# Patient Record
Sex: MALE | Race: WHITE | NOT HISPANIC OR LATINO | Employment: OTHER | URBAN - METROPOLITAN AREA
[De-identification: names, ages, dates, MRNs, and addresses within clinical notes are randomized per-mention and may not be internally consistent; named-entity substitution may affect disease eponyms.]

---

## 2017-02-22 PROBLEM — Z12.11 COLON CANCER SCREENING: Status: ACTIVE | Noted: 2017-02-22

## 2017-03-29 ENCOUNTER — TELEPHONE (OUTPATIENT)
Dept: HEPATOLOGY | Facility: CLINIC | Age: 57
End: 2017-03-29

## 2017-03-29 NOTE — TELEPHONE ENCOUNTER
External records reviewed. Pt referred by Dr. Stevan Onofre. HCV RNA and genotype 4 noted. CBC and CMP done 1/24.    Spoke to pt. States he would be willing to travel to [facility] in the future if needed but would prefer to schedule initial consult at [facility] location.  Consult scheduled on 5/9 in [place]. Reminder mailed.

## 2017-05-09 ENCOUNTER — INITIAL CONSULT (OUTPATIENT)
Dept: HEPATOLOGY | Facility: CLINIC | Age: 57
End: 2017-05-09
Payer: MEDICAID

## 2017-05-09 ENCOUNTER — LAB VISIT (OUTPATIENT)
Dept: LAB | Facility: HOSPITAL | Age: 57
End: 2017-05-09
Attending: INTERNAL MEDICINE
Payer: MEDICAID

## 2017-05-09 VITALS
TEMPERATURE: 98 F | SYSTOLIC BLOOD PRESSURE: 98 MMHG | HEIGHT: 68 IN | BODY MASS INDEX: 29.75 KG/M2 | WEIGHT: 196.31 LBS | HEART RATE: 60 BPM | DIASTOLIC BLOOD PRESSURE: 62 MMHG | RESPIRATION RATE: 18 BRPM

## 2017-05-09 DIAGNOSIS — B18.2 CHRONIC HEPATITIS C WITHOUT HEPATIC COMA: ICD-10-CM

## 2017-05-09 DIAGNOSIS — B18.2 CHRONIC HEPATITIS C WITHOUT HEPATIC COMA: Primary | ICD-10-CM

## 2017-05-09 DIAGNOSIS — R74.8 ABNORMAL TRANSAMINASES: ICD-10-CM

## 2017-05-09 PROCEDURE — 86706 HEP B SURFACE ANTIBODY: CPT

## 2017-05-09 PROCEDURE — 86790 VIRUS ANTIBODY NOS: CPT

## 2017-05-09 PROCEDURE — 99204 OFFICE O/P NEW MOD 45 MIN: CPT | Mod: S$PBB,,, | Performed by: PHYSICIAN ASSISTANT

## 2017-05-09 PROCEDURE — 99999 PR PBB SHADOW E&M-EST. PATIENT-LVL III: CPT | Mod: PBBFAC,,, | Performed by: PHYSICIAN ASSISTANT

## 2017-05-09 PROCEDURE — 87340 HEPATITIS B SURFACE AG IA: CPT

## 2017-05-09 PROCEDURE — 36415 COLL VENOUS BLD VENIPUNCTURE: CPT | Mod: PO

## 2017-05-09 PROCEDURE — 82247 BILIRUBIN TOTAL: CPT

## 2017-05-09 PROCEDURE — 86704 HEP B CORE ANTIBODY TOTAL: CPT

## 2017-05-09 RX ORDER — GLUCOSAM/CHON-MSM1/C/MANG/BOSW 500-416.6
TABLET ORAL
COMMUNITY
Start: 2017-04-18

## 2017-05-09 RX ORDER — CALCIUM CITRATE/VITAMIN D3 200MG-6.25
TABLET ORAL
COMMUNITY
Start: 2017-03-13

## 2017-05-09 NOTE — LETTER
May 9, 2017      Stevan Onofre MD  [address] [facility] [address]  [facility] [address]           [facility]  [address] [facility] [address] [place] [address]  Phone: [phone]          Patient: Andrez Hawkins   MR Number: [id number]   YOB: 1960   Date of Visit: 5/9/2017       Dear Dr. Stevan Onofre:    Thank you for referring Andrez Hawkins to me for evaluation. Attached you will find relevant portions of my assessment and plan of care.    If you have questions, please do not hesitate to call me. I look forward to following Andrez Hawkins along with you.    Sincerely,    Jennifer B. Scheuermann, PA    Enclosure  CC:  No Recipients    If you would like to receive this communication electronically, please contact [email or web address] or [phone] to request more information on [employer] access.    For providers and/or their staff who would like to refer a patient to [facility], please contact us through our one-stop-shop provider referral line, [facility], at [phone].    If you feel you have received this communication in error or would no longer like to receive these types of communications, please e-mail [email or web address]

## 2017-05-09 NOTE — PROGRESS NOTES
HEPATOLOGY CLINIC VISIT NOTE - HCV clinic    OUTSIDE REFERRING PROVIDER: Stevan Onofre MD    CHIEF COMPLAINT: Hepatitis C     HISTORY: This is a 57 y.o. White male with recently diagnosed chronic hepatitis C, here for further eval / mngmt      Originally diagnosed by PCP late ~9/2016  Outside records have been reviewed  Serologic eval for other causes of chronic liver disease has been unyielding  (unremarkable Fe studies, AMA, SMA, CHANTE, ceruloplasmin, A1-AT)      HCV history:  Risks for HCV: Tattoos placed in custodial - early 1980s    IV injection of prescription opiates in 1970s - always used clean needle  - Treatment naive  - Genotype 4  - HCV RNA 3,710,000 IU/mL - 2/2017    Liver staging:  Has had no formal liver staging tests    Labs 1/2017 reveal:   Notable transaminase elevation ,    Normal albumin 4.8   Normal TBili 0.8   Normal plt 339    Denies jaundice, dark urine, abdominal distention, hematemesis, melena, slowed mentation.   No abnormal skin rashes. No generalized joint pain.                     Past Medical History:   Diagnosis Date    Diabetes mellitus     GERD (gastroesophageal reflux disease)     Hepatitis C infection     Hyperlipidemia     Hypertension     Prostate cancer        Past Surgical History:   Procedure Laterality Date    CHOLECYSTECTOMY      COLONOSCOPY N/A 2/22/2017    Procedure: COLONOSCOPY;  Surgeon: Stevan Onofre MD;  Location: [facility];  Service: Endoscopy;  Laterality: N/A;    PROSTATECTOMY         FAMILY HISTORY: Negative for liver disease    SOCIAL HISTORY:   History   Smoking Status    Former Smoker    Quit date: 8/9/2009   Smokeless Tobacco    Not on file     Alcohol - hardly any since 2004. Prior to that drank couple drinks per week  Drugs - as noted above. None since 1970s      ROS:   No fever, chills, weight loss, fatigue  No chest pain, palpitations, dyspnea, cough  No abdominal pain, change in bowel pattern, nausea, vomiting  No skin rashes    No headaches  No lower extremity edema  No depression or anxiety      PHYSICAL EXAM:  Friendly White male, in no acute distress; alert and oriented to person, place and time  VITALS: reviewed  HEENT: Sclerae anicteric.   NECK: Supple  CVS: Regular rate and rhythm. No murmurs  LUNGS: Normal respiratory effort. Clear bilaterally  ABDOMEN: Flat, soft, nontender. No organomegaly or masses. No ascites or hernias. Good bowel sounds.    SKIN: Warm and dry. No jaundice, No obvious rashes. Cutaneous vasculature noted on chest  EXTREMITIES: No lower extremity edema  NEURO/PSYCH: Normal gate. Memory intact. Thought and speech pattern appropriate. Behavior normal. No depression or anxiety noted.    RECENT LABS:  Lab Results   Component Value Date    WBC 7.86 10/18/2016    HGB 13.5 (L) 10/18/2016     10/18/2016     No results found for: INR  Lab Results   Component Value Date     (H) 10/18/2016     (H) 10/18/2016    BILITOT 0.7 10/18/2016    ALBUMIN 3.9 10/18/2016    ALKPHOS 90 10/18/2016    CREATININE 0.87 10/18/2016    BUN 16 10/18/2016     10/18/2016    K 4.0 10/18/2016       RECENT IMAGING:  U/S abdomen - none to review    ASSESSMENT  57 y.o. White male with:  1. RECENTLY DIAGNOSED CHRONIC HEPATITIS C, GENOTYPE 4 - treatment naive  -- Elevated transaminases  -- Unknown Immunity to HAV, HBV      EDUCATION:  The natural history of Hepatitis C, including potential progression to cirrhosis was reviewed.     We discussed the increased progression of liver disease secondary to alcohol use; patient was advised to avoid alcohol completely.     Transmission of Hepatitis C was reviewed, including possible sexual transmission. Sexual contacts should be screened.   Patient should avoid sharing personal products such as razors, toothbrushes, etc.     We reviewed that vaccination against Hepatitis A and Hepatitis B is recommended if patient does not already have immunity.    Recommend avoiding raw  seafood.  Limit acetaminophen to 2000mg daily.    Discussed need for liver staging and goal of antiviral therapy.          PLAN:  1. HAVAB, HBsAb, HBsAg, HBcAb - vaccinate accordingly  2. F/U visit in 6 weeks at [facility] with U/S abdomen, FibroScan    Will get labs required by medicaid at next visit

## 2017-05-10 LAB
HBV CORE AB SERPL QL IA: NEGATIVE
HBV SURFACE AB SER-ACNC: NEGATIVE M[IU]/ML
HBV SURFACE AG SERPL QL IA: NEGATIVE
HEPATITIS A ANTIBODY, IGG: NEGATIVE

## 2017-05-13 LAB
A2 MACROGLOB SERPL-MCNC: 255 MG/DL (ref 106–279)
ALT SERPL W P-5'-P-CCNC: 171 U/L (ref 9–46)
APO A-I SERPL-MCNC: 160 MG/DL (ref 94–176)
BILIRUB SERPL-MCNC: 0.5 MG/DL (ref 0.2–1.2)
FIBROSIS STAGE SERPL QL: ABNORMAL
FIBROTEST INTERPRETATION: ABNORMAL
FOOTNOTE: ABNORMAL
GGT SERPL-CCNC: 47 U/L (ref 3–85)
HAPTOGLOB SERPL-MCNC: 160 MG/DL (ref 43–212)
LIVER FIBR SCORE SERPL CALC.FIBROSURE: 0.35
NECROINFLAMMAT INTERP: ABNORMAL
NECROINFLAMMATORY ACT GRADE SERPL QL: ABNORMAL
NECROINFLAMMATORY ACT SCORE SERPL: 0.78
REFERENCE ID: ABNORMAL

## 2017-05-15 ENCOUNTER — TELEPHONE (OUTPATIENT)
Dept: HEPATOLOGY | Facility: CLINIC | Age: 57
End: 2017-05-15

## 2017-05-15 NOTE — TELEPHONE ENCOUNTER
5/9/17 Labs reviewed:    Lacking HAV/HBV immunity  Will send rx to [facility] to investigate benefits  FibroSURE A3, F1-2    pls call pt:  Labs show patient does NOT have protection against HAV or HBV.   Vaccine for both HAV and HBV is recommended.   Rx for twinrix sent to [facility] so they can investigate whether insurance will cover vaccines when given in pharmacy. Someone will call to schedule.   It will be a series of 3 shots given over 6 months.    Keep f/u appt w/ me at [facility] in June    thanks

## 2017-06-26 ENCOUNTER — PROCEDURE VISIT (OUTPATIENT)
Dept: HEPATOLOGY | Facility: CLINIC | Age: 57
End: 2017-06-26
Attending: INTERNAL MEDICINE
Payer: MEDICAID

## 2017-06-26 ENCOUNTER — CLINICAL SUPPORT (OUTPATIENT)
Dept: INFECTIOUS DISEASES | Facility: CLINIC | Age: 57
End: 2017-06-26
Payer: MEDICAID

## 2017-06-26 ENCOUNTER — OFFICE VISIT (OUTPATIENT)
Dept: HEPATOLOGY | Facility: CLINIC | Age: 57
End: 2017-06-26
Payer: MEDICAID

## 2017-06-26 ENCOUNTER — HOSPITAL ENCOUNTER (OUTPATIENT)
Dept: RADIOLOGY | Facility: HOSPITAL | Age: 57
Discharge: HOME | End: 2017-06-26
Attending: INTERNAL MEDICINE
Payer: MEDICAID

## 2017-06-26 VITALS
WEIGHT: 196.88 LBS | BODY MASS INDEX: 29.84 KG/M2 | HEIGHT: 68 IN | DIASTOLIC BLOOD PRESSURE: 78 MMHG | RESPIRATION RATE: 18 BRPM | SYSTOLIC BLOOD PRESSURE: 137 MMHG | OXYGEN SATURATION: 97 % | HEART RATE: 63 BPM

## 2017-06-26 DIAGNOSIS — B18.2 CHRONIC HEPATITIS C WITHOUT HEPATIC COMA: ICD-10-CM

## 2017-06-26 DIAGNOSIS — R74.8 ABNORMAL TRANSAMINASES: ICD-10-CM

## 2017-06-26 DIAGNOSIS — B18.2 CHRONIC HEPATITIS C WITHOUT HEPATIC COMA: Primary | ICD-10-CM

## 2017-06-26 PROCEDURE — 76700 US EXAM ABDOM COMPLETE: CPT | Mod: 26,,, | Performed by: INTERNAL MEDICINE

## 2017-06-26 PROCEDURE — 99213 OFFICE O/P EST LOW 20 MIN: CPT | Mod: S$PBB,,, | Performed by: PHYSICIAN ASSISTANT

## 2017-06-26 PROCEDURE — 91200 LIVER ELASTOGRAPHY: CPT | Mod: 26,S$PBB,, | Performed by: PHYSICIAN ASSISTANT

## 2017-06-26 PROCEDURE — 99999 PR PBB SHADOW E&M-EST. PATIENT-LVL IV: CPT | Mod: PBBFAC,,, | Performed by: PHYSICIAN ASSISTANT

## 2017-06-26 PROCEDURE — 90636 HEP A/HEP B VACC ADULT IM: CPT | Mod: PBBFAC

## 2017-06-26 PROCEDURE — 99212 OFFICE O/P EST SF 10 MIN: CPT | Mod: PBBFAC,25,27

## 2017-06-26 PROCEDURE — 99999 PR PBB SHADOW E&M-EST. PATIENT-LVL II: CPT | Mod: PBBFAC,,,

## 2017-06-26 RX ORDER — METRONIDAZOLE 7.5 MG/G
1 GEL TOPICAL 2 TIMES DAILY PRN
COMMUNITY
Start: 2017-06-13

## 2017-06-26 RX ORDER — ELBASVIR AND GRAZOPREVIR 50; 100 MG/1; MG/1
1 TABLET, FILM COATED ORAL DAILY
Qty: 28 TABLET | Refills: 2 | Status: SHIPPED | OUTPATIENT
Start: 2017-06-26 | End: 2017-10-30 | Stop reason: ALTCHOICE

## 2017-06-26 RX ORDER — MECLIZINE HYDROCHLORIDE 25 MG/1
25 TABLET ORAL
COMMUNITY
Start: 2017-05-23 | End: 2021-07-19 | Stop reason: CLARIF

## 2017-06-26 NOTE — PROGRESS NOTES
HEPATOLOGY CLINIC VISIT NOTE - HCV clinic    CHIEF COMPLAINT: Hepatitis C     HISTORY: This is a 57 y.o. White male with recently diagnosed chronic hepatitis C, here for f/u after having FibroScan, u/s, and additional labs.    He feels well  Very interested in HCV treatment  Denies jaundice, dark urine, hematemesis, melena, slowed mentation, abdominal distention.     Labs reveal he is lacking immunity to HAV/HBV - vaccine not covered through pharmacy so will need to be administered in clinic      HCV history:  Originally diagnosed by PCP late ~9/2016  Risks for HCV: Tattoos placed in halfway - early 1980s    IV injection of prescription opiates in 1970s - always used clean needle  - Treatment naive  - Genotype 4  - HCV RNA 3,710,000 IU/mL - 2/2017    Liver staging:  FibroScan today 6/29/17 - kPa 6.7 - F0-1    Labs / imaging are consistent w/ fibroscan:  - transaminase elevation w/ ALT>AST, Normal TBili and albumin  - plt > 200, INR normal  - U/S w/ normal liver and spleen    Other:  Outside serologic eval for other causes of chronic liver disease has been unyielding  (unremarkable Fe studies, AMA, SMA, CHANTE, ceruloplasmin, A1-AT)                     Past Medical History:   Diagnosis Date    Diabetes mellitus     GERD (gastroesophageal reflux disease)     Hepatitis C infection     Hyperlipidemia     Hypertension     Prostate cancer        Past Surgical History:   Procedure Laterality Date    CHOLECYSTECTOMY      COLONOSCOPY N/A 2/22/2017    Procedure: COLONOSCOPY;  Surgeon: Stevan Onofre MD;  Location: [facility];  Service: Endoscopy;  Laterality: N/A;    PROSTATECTOMY      TONSILLECTOMY         FAMILY HISTORY: Negative for liver disease    SOCIAL HISTORY:   History   Smoking Status    Former Smoker    Quit date: 8/9/2009   Smokeless Tobacco    Not on file     Alcohol - hardly any since 2004. Prior to that drank couple drinks per week  Drugs - as noted above. None since 1970s      ROS:   No fever,  chills, weight loss, fatigue  No chest pain, palpitations, dyspnea, cough  No abdominal pain, change in bowel pattern, nausea, vomiting  No skin rashes   No headaches  No lower extremity edema  No depression or anxiety      PHYSICAL EXAM:  Friendly White male, in no acute distress; alert and oriented to person, place and time  VITALS: reviewed  HEENT: Sclerae anicteric.   NECK: Supple  LUNGS: Normal respiratory effort.   ABDOMEN: Flat, soft, nontender.  SKIN: Warm and dry. No jaundice, No obvious rashes.   EXTREMITIES: No lower extremity edema  NEURO/PSYCH: Normal gate. Memory intact. Thought and speech pattern appropriate. Behavior normal. No depression or anxiety noted.    RECENT LABS:  Lab Results   Component Value Date    WBC 7.86 10/18/2016    HGB 13.5 (L) 10/18/2016     10/18/2016     No results found for: INR  Lab Results   Component Value Date     (H) 10/18/2016     (H) 10/18/2016    BILITOT 0.7 10/18/2016    ALBUMIN 3.9 10/18/2016    ALKPHOS 90 10/18/2016    CREATININE 0.87 10/18/2016    BUN 16 10/18/2016     10/18/2016    K 4.0 10/18/2016       RECENT IMAGING:  U/S abdomen - 6/26/17  Narrative   No comparison.    The visualized pancreas is within normal limits.  The aorta is non-aneurysmal.  The gallbladder has been removed and the common duct is normal caliber, 2 mm.    Liver size is normal, 15.2 cm, and the parenchyma demonstrates no abnormality.  HRI is 1.1.    The inferior vena cava is within normal limits.  Kidneys have a normal size and appearance and measure 11.5 cm on the right and 10.8 cm on the left.  The spleen is not enlarged, 10.5 x 4.6 cm.   Impression    Cholecystectomy         ASSESSMENT  57 y.o. White male with:  1. RECENTLY DIAGNOSED CHRONIC HEPATITIS C, GENOTYPE 4 - treatment naive  -- Elevated transaminases  -- Lacking Immunity to HAV, HBV      EDUCATION:  Discussed goal of HCV eradication to prevent progression of liver disease.  Discussed use of Zepatier  daily x 12 weeks w/ potential side effects of fatigue and headache.     Herbal / alternative therapies must be discontinued / avoided  Potential for drug drug interactions reviewed  - Current med list reviewed; no interactions noted  - Pt to notify me if other drugs are prescribed so potential interactions can be assessed    Importance of drug compliance reviewed w/ risk of treatment failure and viral resistance if pt is not adherent to regimen          PLAN:  1. Obtain authorization to treat HCV with Zepatier daily x 12 weeks  -- Rx will be routed to [facility]  -- Patient will notify me of exact treatment start date so appropriate lab f/u can be scheduled.  2. Twinrix vaccine series - 1st dose in ID clinic today

## 2017-06-26 NOTE — PROCEDURES
Procedures   Name: Andrez Hawkins  Date of Procedure : 2017   :: Jennifer B Scheuermann, PA  Diagnosis: HCV  Probe: M    Fibroscan readin.7 KPa    IQR/med:18 %    Fibrosis:F 0-1

## 2017-06-27 ENCOUNTER — TELEPHONE (OUTPATIENT)
Dept: PHARMACY | Facility: CLINIC | Age: 57
End: 2017-06-27

## 2017-06-30 NOTE — TELEPHONE ENCOUNTER
Faxed prior authorization for Zepatier to insurance company for review on Fri 06/30/2017 @4:32pm DAJA

## 2017-07-11 NOTE — TELEPHONE ENCOUNTER
Tena,    Insurance authorization was submitted for Gerhard for Mr Hwakins however this was denied due to the lack of advanced hepatic disease.      Please advise on how you wish to proceed.    Garrick Silva, PharmD, BCPS [facility]  [phone]

## 2017-07-19 NOTE — TELEPHONE ENCOUNTER
Can we please appeal this based on his significantly elevated transaminases that have been steadily trending upwards for the past year w/ most recent , .  Other causes of abnormal liver panel and chronic liver disease have been ruled out.  His level of transaminase elevation puts him at greater risk for more rapid disease progression to cirrhosis.  (FRANCISCO J Pedraza et al; Journal of Viral Hepatitis 2003;10(4))

## 2017-07-21 ENCOUNTER — EPISODE CHANGES (OUTPATIENT)
Dept: HEPATOLOGY | Facility: CLINIC | Age: 57
End: 2017-07-21

## 2017-07-21 NOTE — TELEPHONE ENCOUNTER
Peer to Peer review requested with primary insurer.    Pending call back from insurer.  If the decision remains, we will formally appeal with this data you provided.    Thank you.    Garrick Silva, PharmD, [facility]  [facility]  [phone]

## 2017-07-28 ENCOUNTER — EPISODE CHANGES (OUTPATIENT)
Dept: HEPATOLOGY | Facility: CLINIC | Age: 57
End: 2017-07-28

## 2017-07-28 NOTE — TELEPHONE ENCOUNTER
Bev,     Specialty Pharmacy has requested insurance authorization for Duer on Mr Hawkins which was initially denied however this decision was overturned during the [id number] review.      PA#[id number]  Approval dates: 7/27/17-10/19/17    Pharmacy will contact the patient to obtain copay assistance (if applicable), schedule consultation, and schedule pick-up\shipment of the medication.      We will keep you informed with the tentative start date.      Garrick Silva, PharmD, BCPS [facility]  [phone]

## 2017-08-01 ENCOUNTER — EPISODE CHANGES (OUTPATIENT)
Dept: HEPATOLOGY | Facility: CLINIC | Age: 57
End: 2017-08-01

## 2017-08-01 ENCOUNTER — TELEPHONE (OUTPATIENT)
Dept: PHARMACY | Facility: CLINIC | Age: 57
End: 2017-08-01

## 2017-08-01 NOTE — TELEPHONE ENCOUNTER
Initial zepatier consult complete. Name/ confirmed. Medication list reviewed and updated. Consultation included: indication; goals of treatment; administration; storage and handling; side effects; how to handle side effects; the importance of compliance; how to handle missed doses; the importance of laboratory monitoring; the importance of keeping all follow up appointments. Patient understands to report any medication changes to [facility] and provider. All questions answered and addressed to patients satisfaction. patient understands goals of the medication treatment and regimen; he endorses strict compliance with daily med regimen and will incorporate this into his normal regimen; he is pleased to be being treated at this time; approximately 25mins spent with the patient on in depth med/disease state education; he is afforded the opportunity to ask questions and had none    Initial Zepatier consult completed on 17. Zepatier will be shipped on 17 to arrive at patient's home on 8/3/17 via [employer]. $ 3 copay. Patient will start Zepatier on 17. Address and CC info confirmed. Confirmed 2 patient identifiers - name and . Therapy Appropriate.     Zepatier 50/100mg- Take one tablet by mouth daily x 12 weeks  Counseling was reviewed:   1. Patient MUST take Zepatier at the SAME time every day.   2. Potential Side effects include: nausea, diarrhea, headaches, insomnia and fatigue.   Headache: Patient may treat with OTC remedies. If Tylenol is used, dose should not exceed 2000mg per day.    4. Medication list reviewed. No DDIs or allergies noted. Patient MUST contact myself or provider prior to starting any new OTC, herbal, or prescription drugs to avoid potential DDIs.    DDI: Medication list reviewed and potential DDIs addressed.     Discussed the importance of staying well hydrated while on therapy. Compliance stressed - patient to take missed doses as soon as remembered, but NOT to take 2 doses in one day.  Patient will report questions or concerns to myself or practitioner. Patient verbalizes understanding.   Patient will start Zepatier on 8/7/17. Consultation included: indication; goals of treatment; administration; storage and handling; side effects; how to handle side effects; the importance of compliance; how to handle missed doses; the importance of laboratory monitoring; the importance of keeping all follow up appointments.  Patient understands to report any medication changes to [facility] and provider. All questions answered and addressed to patients satisfaction. I will f/u with her in 1 week from start, [facility] to contact patient in 3 weeks for refills.       Garrick Silva, PharmD, [facility]  [facility]  [phone]

## 2017-08-01 NOTE — TELEPHONE ENCOUNTER
DOCUMENTATION ONLY  Zepatier prior authorization approved on 07/27/2017 x 12 weeks.  Approval date: 07/27/2017 to 10/19/2017  PA# [id number]  Co-pay: $3.00

## 2017-08-02 ENCOUNTER — TELEPHONE (OUTPATIENT)
Dept: HEPATOLOGY | Facility: CLINIC | Age: 57
End: 2017-08-02

## 2017-08-02 ENCOUNTER — EPISODE CHANGES (OUTPATIENT)
Dept: HEPATOLOGY | Facility: CLINIC | Age: 57
End: 2017-08-02

## 2017-08-02 DIAGNOSIS — B18.2 CHRONIC HEPATITIS C WITHOUT HEPATIC COMA: Primary | ICD-10-CM

## 2017-08-02 NOTE — TELEPHONE ENCOUNTER
Pt beginning 12 weeks of Zepatier on 8/7/17  Lottie 4, tx naive, F0-1        Pls schedule:  - CMP, HCV RNA at week 3 - 8/25 (b/c I'm out of town at week 4)  - CMP at week 8 - 9/29  - CMP, HCV RNA at week 12 - end of treatment - 10/27      thanks

## 2017-08-02 NOTE — TELEPHONE ENCOUNTER
I spoke with patient and message from PA Scheuermann relayed.  Labs scheduled and reminder notices mailed.

## 2017-08-07 ENCOUNTER — CLINICAL SUPPORT (OUTPATIENT)
Dept: INFECTIOUS DISEASES | Facility: CLINIC | Age: 57
End: 2017-08-07
Payer: MEDICAID

## 2017-08-07 PROCEDURE — 99212 OFFICE O/P EST SF 10 MIN: CPT | Mod: PBBFAC,25

## 2017-08-07 PROCEDURE — 99999 PR PBB SHADOW E&M-EST. PATIENT-LVL II: CPT | Mod: PBBFAC,,,

## 2017-08-07 PROCEDURE — 90636 HEP A/HEP B VACC ADULT IM: CPT | Mod: PBBFAC

## 2017-08-17 ENCOUNTER — TELEPHONE (OUTPATIENT)
Dept: PHARMACY | Facility: CLINIC | Age: 57
End: 2017-08-17

## 2017-08-17 NOTE — TELEPHONE ENCOUNTER
zepatier f/u complete. Name/ confirmed. Medication list reviewed and updated. Consultation included: indication; goals of treatment; administration; storage and handling; side effects; how to handle side effects; the importance of compliance; how to handle missed doses; the importance of laboratory monitoring; the importance of keeping all follow up appointments. Patient understands to report any medication changes to [facility] and provider. All questions answered and addressed to patients satisfaction. patient acknowledges understanding of medication regimen; he endorses strict compliance; he understands the importance of lab monitoring and is aware of upcoming appointments; he reports NO Side effects whatsoever; he reports no new medication; med list reviewed; he was afforded the opportunity to ask questions and had none

## 2017-08-23 ENCOUNTER — TELEPHONE (OUTPATIENT)
Dept: PHARMACY | Facility: CLINIC | Age: 57
End: 2017-08-23

## 2017-08-25 ENCOUNTER — LAB VISIT (OUTPATIENT)
Dept: LAB | Facility: HOSPITAL | Age: 57
End: 2017-08-25
Attending: INTERNAL MEDICINE
Payer: MEDICAID

## 2017-08-25 ENCOUNTER — EPISODE CHANGES (OUTPATIENT)
Dept: HEPATOLOGY | Facility: CLINIC | Age: 57
End: 2017-08-25

## 2017-08-25 DIAGNOSIS — B18.2 CHRONIC HEPATITIS C WITHOUT HEPATIC COMA: ICD-10-CM

## 2017-08-25 LAB
ALBUMIN SERPL BCP-MCNC: 3.9 G/DL
ALP SERPL-CCNC: 70 U/L
ALT SERPL W/O P-5'-P-CCNC: 21 U/L
ANION GAP SERPL CALC-SCNC: 11 MMOL/L
AST SERPL-CCNC: 19 U/L
BILIRUB SERPL-MCNC: 0.7 MG/DL
BUN SERPL-MCNC: 20 MG/DL
CALCIUM SERPL-MCNC: 9.6 MG/DL
CHLORIDE SERPL-SCNC: 102 MMOL/L
CO2 SERPL-SCNC: 22 MMOL/L
CREAT SERPL-MCNC: 1.1 MG/DL
EST. GFR  (AFRICAN AMERICAN): >60 ML/MIN/1.73 M^2
EST. GFR  (NON AFRICAN AMERICAN): >60 ML/MIN/1.73 M^2
GLUCOSE SERPL-MCNC: 80 MG/DL
POTASSIUM SERPL-SCNC: 4.2 MMOL/L
PROT SERPL-MCNC: 8 G/DL
SODIUM SERPL-SCNC: 135 MMOL/L

## 2017-08-25 PROCEDURE — 87522 HEPATITIS C REVRS TRNSCRPJ: CPT

## 2017-08-25 PROCEDURE — 80053 COMPREHEN METABOLIC PANEL: CPT

## 2017-08-28 LAB
HCV LOG: <1.08 LOG (10) IU/ML
HCV RNA QUANT PCR: <12 IU/ML
HCV, QUALITATIVE: DETECTED IU/ML

## 2017-08-29 ENCOUNTER — TELEPHONE (OUTPATIENT)
Dept: HEPATOLOGY | Facility: CLINIC | Age: 57
End: 2017-08-29

## 2017-08-29 NOTE — TELEPHONE ENCOUNTER
HCV LAB REVIEW  Week 3 of zepatier , planning on 12 weeks treatment  Lottie 4, tx naive, F0-1    Pertinent labs:  8/25  CMP stable  HCV <12, detected      pls call pt:  Labs look good. Liver enzymes now normal!! HCV is too low to count  Meds are working well!  - Continue zepatier - 1 pill daily - don't miss any doses.    Any side effects or problems??    Next labs due  - CMP at week 8 - 9/29  - CMP, HCV RNA at week 12 - end of treatment - 10/27

## 2017-08-29 NOTE — TELEPHONE ENCOUNTER
I spoke with patient and message from PA Scheuermann relayed.  He denied SEs.  Labs already scheduled as ordered.

## 2017-09-25 ENCOUNTER — TELEPHONE (OUTPATIENT)
Dept: PHARMACY | Facility: CLINIC | Age: 57
End: 2017-09-25

## 2017-09-29 ENCOUNTER — TELEPHONE (OUTPATIENT)
Dept: HEPATOLOGY | Facility: CLINIC | Age: 57
End: 2017-09-29

## 2017-09-29 ENCOUNTER — EPISODE CHANGES (OUTPATIENT)
Dept: HEPATOLOGY | Facility: CLINIC | Age: 57
End: 2017-09-29

## 2017-09-29 ENCOUNTER — LAB VISIT (OUTPATIENT)
Dept: LAB | Facility: HOSPITAL | Age: 57
End: 2017-09-29
Attending: INTERNAL MEDICINE
Payer: MEDICAID

## 2017-09-29 DIAGNOSIS — B18.2 CHRONIC HEPATITIS C WITHOUT HEPATIC COMA: ICD-10-CM

## 2017-09-29 LAB
ALBUMIN SERPL BCP-MCNC: 3.7 G/DL
ALP SERPL-CCNC: 76 U/L
ALT SERPL W/O P-5'-P-CCNC: 15 U/L
ANION GAP SERPL CALC-SCNC: 8 MMOL/L
AST SERPL-CCNC: 18 U/L
BILIRUB SERPL-MCNC: 0.7 MG/DL
BUN SERPL-MCNC: 18 MG/DL
CALCIUM SERPL-MCNC: 9.8 MG/DL
CHLORIDE SERPL-SCNC: 102 MMOL/L
CO2 SERPL-SCNC: 26 MMOL/L
CREAT SERPL-MCNC: 1 MG/DL
EST. GFR  (AFRICAN AMERICAN): >60 ML/MIN/1.73 M^2
EST. GFR  (NON AFRICAN AMERICAN): >60 ML/MIN/1.73 M^2
GLUCOSE SERPL-MCNC: 82 MG/DL
POTASSIUM SERPL-SCNC: 4.5 MMOL/L
PROT SERPL-MCNC: 7.9 G/DL
SODIUM SERPL-SCNC: 136 MMOL/L

## 2017-09-29 PROCEDURE — 80053 COMPREHEN METABOLIC PANEL: CPT

## 2017-09-29 PROCEDURE — 36415 COLL VENOUS BLD VENIPUNCTURE: CPT | Mod: PO

## 2017-09-29 NOTE — TELEPHONE ENCOUNTER
HCV LAB REVIEW  Week 8 of zepatier , planning on 12 weeks treatment  Lottie 4, tx naive, F0-1    Pertinent labs:  9/29  CMP stable      pls call pt:  Labs look good. Liver enzymes now normal  - Continue zepatier - 1 pill daily - don't miss any doses.  ONE month of treatment left!      Next labs due  - CMP, HCV RNA at week 12 - end of treatment - 10/27

## 2017-10-27 ENCOUNTER — EPISODE CHANGES (OUTPATIENT)
Dept: HEPATOLOGY | Facility: CLINIC | Age: 57
End: 2017-10-27

## 2017-10-27 ENCOUNTER — LAB VISIT (OUTPATIENT)
Dept: LAB | Facility: HOSPITAL | Age: 57
End: 2017-10-27
Attending: INTERNAL MEDICINE
Payer: MEDICAID

## 2017-10-27 DIAGNOSIS — B18.2 CHRONIC HEPATITIS C WITHOUT HEPATIC COMA: ICD-10-CM

## 2017-10-27 LAB
ALBUMIN SERPL BCP-MCNC: 3.9 G/DL
ALP SERPL-CCNC: 71 U/L
ALT SERPL W/O P-5'-P-CCNC: 18 U/L
ANION GAP SERPL CALC-SCNC: 9 MMOL/L
AST SERPL-CCNC: 17 U/L
BILIRUB SERPL-MCNC: 0.7 MG/DL
BUN SERPL-MCNC: 19 MG/DL
CALCIUM SERPL-MCNC: 9.6 MG/DL
CHLORIDE SERPL-SCNC: 102 MMOL/L
CO2 SERPL-SCNC: 25 MMOL/L
CREAT SERPL-MCNC: 0.9 MG/DL
EST. GFR  (AFRICAN AMERICAN): >60 ML/MIN/1.73 M^2
EST. GFR  (NON AFRICAN AMERICAN): >60 ML/MIN/1.73 M^2
GLUCOSE SERPL-MCNC: 105 MG/DL
POTASSIUM SERPL-SCNC: 4.4 MMOL/L
PROT SERPL-MCNC: 8.1 G/DL
SODIUM SERPL-SCNC: 136 MMOL/L

## 2017-10-27 PROCEDURE — 87522 HEPATITIS C REVRS TRNSCRPJ: CPT

## 2017-10-27 PROCEDURE — 80053 COMPREHEN METABOLIC PANEL: CPT

## 2017-10-27 PROCEDURE — 36415 COLL VENOUS BLD VENIPUNCTURE: CPT | Mod: PO

## 2017-10-30 ENCOUNTER — TELEPHONE (OUTPATIENT)
Dept: HEPATOLOGY | Facility: CLINIC | Age: 57
End: 2017-10-30

## 2017-10-30 DIAGNOSIS — B18.2 CHRONIC HEPATITIS C WITHOUT HEPATIC COMA: Primary | ICD-10-CM

## 2017-10-30 LAB
HCV LOG: <1.08 LOG (10) IU/ML
HCV RNA QUANT PCR: <12 IU/ML
HCV, QUALITATIVE: NOT DETECTED IU/ML

## 2017-10-30 NOTE — TELEPHONE ENCOUNTER
HCV LAB REVIEW  Week 12 of zepatier,  END OF TREATMENT  Lottie 4, tx naive, F0-1    Pertinent labs:  10/27  CMP stable  HCV neg      pls call pt:  Labs look good. Liver enzymes normal. HCV negative  Patient should be finishing HCV treatment any day now.   There is a small chance the virus can return. We will monitor blood for this.        Next labs due  - HCV RNA - SVR12 - 1/19

## 2017-10-31 ENCOUNTER — EPISODE CHANGES (OUTPATIENT)
Dept: HEPATOLOGY | Facility: CLINIC | Age: 57
End: 2017-10-31

## 2017-11-01 ENCOUNTER — TELEPHONE (OUTPATIENT)
Dept: PHARMACY | Facility: CLINIC | Age: 57
End: 2017-11-01

## 2017-11-01 NOTE — TELEPHONE ENCOUNTER
Called patient for QOL assessment at [facility].  Name and  confirmed.  Patient states that he feels fine and is having no issues at the moment.  Patient reminded to maintain and keep contact with provider's office and keep all appointments for labs, etc.     Frank Felix, ADDIE.Ph.  Clinical Pharmacist  [facility]  Phone: [phone]

## 2018-03-02 ENCOUNTER — EPISODE CHANGES (OUTPATIENT)
Dept: HEPATOLOGY | Facility: CLINIC | Age: 58
End: 2018-03-02

## 2018-03-06 ENCOUNTER — LAB VISIT (OUTPATIENT)
Dept: LAB | Facility: HOSPITAL | Age: 58
End: 2018-03-06
Attending: PHYSICIAN ASSISTANT
Payer: MEDICAID

## 2018-03-06 DIAGNOSIS — B18.2 CHRONIC HEPATITIS C WITHOUT HEPATIC COMA: ICD-10-CM

## 2018-03-06 PROCEDURE — 87522 HEPATITIS C REVRS TRNSCRPJ: CPT

## 2018-03-06 PROCEDURE — 80053 COMPREHEN METABOLIC PANEL: CPT

## 2018-03-07 LAB
ALBUMIN SERPL BCP-MCNC: 4.3 G/DL
ALP SERPL-CCNC: 59 U/L
ALT SERPL W/O P-5'-P-CCNC: 18 U/L
ANION GAP SERPL CALC-SCNC: 11 MMOL/L
AST SERPL-CCNC: 20 U/L
BILIRUB SERPL-MCNC: 0.6 MG/DL
BUN SERPL-MCNC: 14 MG/DL
CALCIUM SERPL-MCNC: 9.5 MG/DL
CHLORIDE SERPL-SCNC: 104 MMOL/L
CO2 SERPL-SCNC: 22 MMOL/L
CREAT SERPL-MCNC: 1 MG/DL
EST. GFR  (AFRICAN AMERICAN): >60 ML/MIN/1.73 M^2
EST. GFR  (NON AFRICAN AMERICAN): >60 ML/MIN/1.73 M^2
GLUCOSE SERPL-MCNC: 91 MG/DL
POTASSIUM SERPL-SCNC: 4.2 MMOL/L
PROT SERPL-MCNC: 8.1 G/DL
SODIUM SERPL-SCNC: 137 MMOL/L

## 2018-03-08 ENCOUNTER — EPISODE CHANGES (OUTPATIENT)
Dept: HEPATOLOGY | Facility: CLINIC | Age: 58
End: 2018-03-08

## 2018-03-09 LAB
HCV LOG: <1.08 LOG (10) IU/ML
HCV RNA QUANT PCR: <12 IU/ML
HCV, QUALITATIVE: NOT DETECTED IU/ML

## 2018-03-12 ENCOUNTER — TELEPHONE (OUTPATIENT)
Dept: HEPATOLOGY | Facility: CLINIC | Age: 58
End: 2018-03-12

## 2018-03-12 DIAGNOSIS — Z86.19 HISTORY OF HEPATITIS C: Primary | ICD-10-CM

## 2018-03-12 NOTE — TELEPHONE ENCOUNTER
HCV LAB REVIEW  completed 12weeks of zepatier, 10/27/17  Lottie 4, tx naive, F0-1    Pertinent labs:  3/6/18  HCV neg     These labs document SVR18 following successful HCV treatment with Zepatier  This is consistent with a cure. Relapse is not expected.   No immunity is conferred and patient could be reinfected.     Pt has been notified       Please schedule labs - HCV RNA - 3/2019

## 2019-03-06 ENCOUNTER — LAB VISIT (OUTPATIENT)
Dept: LAB | Facility: HOSPITAL | Age: 59
End: 2019-03-06
Attending: PHYSICIAN ASSISTANT
Payer: MEDICAID

## 2019-03-06 DIAGNOSIS — Z86.19 HISTORY OF HEPATITIS C: ICD-10-CM

## 2019-03-06 PROCEDURE — 87522 HEPATITIS C REVRS TRNSCRPJ: CPT

## 2019-03-06 PROCEDURE — 36415 COLL VENOUS BLD VENIPUNCTURE: CPT | Mod: PO

## 2019-03-11 LAB
HCV RNA SERPL NAA+PROBE-LOG IU: <1.08 LOG (10) IU/ML
HCV RNA SERPL QL NAA+PROBE: NOT DETECTED IU/ML
HCV RNA SPEC NAA+PROBE-ACNC: <12 IU/ML

## 2019-06-14 PROBLEM — R10.13 EPIGASTRIC PAIN: Status: ACTIVE | Noted: 2019-06-14

## 2020-08-03 ENCOUNTER — CLINICAL SUPPORT (OUTPATIENT)
Dept: REHABILITATION | Facility: HOSPITAL | Age: 60
End: 2020-08-03
Payer: MEDICAID

## 2020-08-03 DIAGNOSIS — M62.838 SPASM OF MUSCLE: ICD-10-CM

## 2020-08-03 DIAGNOSIS — M43.02 SPONDYLOLYSIS OF CERVICAL REGION: ICD-10-CM

## 2020-08-03 PROCEDURE — 97161 PT EVAL LOW COMPLEX 20 MIN: CPT | Mod: PO

## 2020-08-03 PROCEDURE — 97110 THERAPEUTIC EXERCISES: CPT | Mod: PO

## 2020-08-03 NOTE — PLAN OF CARE
[facility]  Physical Therapy Initial Evaluation    Name: Andrez Hawkins  Clinic Number: [id number]    Therapy Diagnosis:   Encounter Diagnoses   Name Primary?    Spondylolysis of cervical region     Spasm of muscle      Physician: Yulisa Serna FNP*    Physician Orders: PT Eval and Treat    Medical Diagnosis from Referral: neck pain   Evaluation Date: 8/3/2020  Authorization Period Expiration: 12/31/20   Plan of Care Expiration: 9/18/20   Visit # / Visits authorized: 1 / 8    Time In: 1020 AM   Time Out: 1045 AM   Total Billable Time: 25  minutes    Precautions: Standard and Diabetes    Subjective   Date of onset: 7/3/20   History of current condition - Pat reports: insidious onset of neck pain, L sided neck and upper trap pain increased with rotating his head, sleeping, ad overall daily activity.       Medical History:   Past Medical History:   Diagnosis Date    Diabetes mellitus     GERD (gastroesophageal reflux disease)     Hepatitis C infection     History of hepatitis C, s/p successful Rx w/ SVR12 (cure) - 1/2018 10/14/2016    S/p Zepatier w/ SVR    Hyperlipidemia     Hypertension     Prostate cancer        Surgical History:   Andrez Hawkins  has a past surgical history that includes Cholecystectomy; Prostatectomy; Colonoscopy (N/A, 2/22/2017); Tonsillectomy; and Esophagogastroduodenoscopy (N/A, 6/14/2019).    Medications:   Andrez has a current medication list which includes the following prescription(s): lisinopril, meclizine, metformin, metronidazole, naproxen, ranitidine, true metrix glucose test strip, and trueplus lancets.    Allergies:   Review of patient's allergies indicates:  No Known Allergies     Imaging: See Epic      Prior Therapy: none   Social History:   lives with their daughter  Occupation: disabled  Prior Level of Function: no neck pain with daily activity   Current Level of Function: neck pain with turning his head     Pain:  Current 6/10,  worst 8/10, best 4/10   Location: left sided neck pain    Description: Aching, Grabbing and Tight  Aggravating Factors: turning head, sneezing, sleeping  Easing Factors: nothing    Pt functional goal: decrease pain with turning head        Objective       Posture: fwd head   Palpation: TTP L UT   Sensation: intact to light touch     Range of Motion/Strength:    CERVICAL AROM Pain/Dysfunction with Movement   Flexion 50 -   Extension 40 *P L sided    Right side bending 30 *P Left sided   Left side bending 30     Right rotation 65  *P Left sided    Left rotation 70 *P Left sided       U/E MMT Left Right Pain/Dysfunction with Movement   Shoulder Flexion 5/5 5/5    Shoulder Extension 5/5 5/5    Shoulder Abduction 5/5 5/5    Shoulder Adduction 5/5 5/5    Shoulder IR 5/5 5/5    Shoulder ER  @ 0* Abduction 5/5 5/5    Elbow Flexion  5/5 5/5    Elbow Extension 5/5 5/5    Scapular stabilizers 4/5 4/5                   Special Tests Left Right   Cervical compression - -   Quadrant + -     Other:     CMS Impairment/Limitation/Restriction for FOTO  Survey    Therapist reviewed FOTO scores for Andrez Hawkins on 8/3/2020.   FOTO documents entered into [employer] - see Media section.    Limitation Score: TBA            TREATMENT   Treatment Time In: 1035 am   Treatment Time Out: 1045 am   Total Treatment time separate from Evaluation: 10 minutes    Pat received therapeutic exercises to develop strength, endurance, ROM, flexibility, posture and core stabilization for 10 minutes including:      Exercise  8/3/20   UBE -   Cervical ext with towel  x20   tball rollouts  x20   Thoacic ext  X 20    Scap retract  X 20   Chin tucks  X 20           Home Exercises and Patient Education Provided    Education provided:   - HEP     Written Home Exercises Provided: yes.  Exercises were reviewed and Pat was able to demonstrate them prior to the end of the session.  Pat demonstrated good  understanding of the education provided.     See EMR under  Media for exercises provided 8/3/2020.    Assessment   Andrez is a 60 y.o. male referred to outpatient Physical Therapy with a medical diagnosis of neck pain . Pt presents with decreased ROM, strength, postural stability and activity tolerance./     Pt prognosis is Good.   Pt will benefit from skilled outpatient Physical Therapy to address the deficits stated above and in the chart below, provide pt/family education, and to maximize pt's level of independence.     Plan of care discussed with patient: Yes  Pt's spiritual, cultural and educational needs considered and patient is agreeable to the plan of care and goals as stated below:     Anticipated Barriers for therapy:  Insurance    Medical Necessity is demonstrated by the following  History  Co-morbidities and personal factors that may impact the plan of care Co-morbidities:   DM and HTN    Personal Factors:   age  lifestyle     low   Examination  Body Structures and Functions, activity limitations and participation restrictions that may impact the plan of care Body Regions:   neck    Body Systems:    gross symmetry  ROM  strength  gross coordinated movement  motor control  motor learning    Participation Restrictions:   nopne     Activity limitations:   Learning and applying knowledge  no deficits    General Tasks and Commands  no deficits    Communication  no deficits    Mobility  lifting and carrying objects  driving (bike, car, motorcycle)    Self care  no deficits    Domestic Life  cooking  doing house work (cleaning house, washing dishes, laundry)    Interactions/Relationships  no deficits    Life Areas  no deficits    Community and Social Life  community life         low   Clinical Presentation stable and uncomplicated low   Decision Making/ Complexity Score: low         Short Term Goals: 3  weeks   - Tolerate PT exercises with minimal increase in pain for improved strength and conditioning   - Report 50% improvement in pain sx for improved tolerance with  daily activities at home and in community.  - FOTO TBA     Long Term Goals: 6 weeks  - Restore full painfree ROM to cervical spine  for improved functional mobility   - Demonstrate improved strength of Ruel scap stab  to 5/5 for improved stability with all daily  activities   - Report MCID with FOTO  demonstrating return to PLOF with daily activities.   - Be engaged in HEP/fitness routine for continued strength and conditioning upon d/c from PT.           Plan   Plan of care Certification: 8/3/2020 to 9/18/20 .    Outpatient Physical Therapy 2 times weekly for 4 weeks to include the following interventions: Manual Therapy, Moist Heat/ Ice, Neuromuscular Re-ed, Patient Education, Self Care, Therapeutic Activites, Therapeutic Exercise and Ultrasound.     Stan Thomas, PT

## 2020-08-11 ENCOUNTER — CLINICAL SUPPORT (OUTPATIENT)
Dept: REHABILITATION | Facility: HOSPITAL | Age: 60
End: 2020-08-11
Payer: MEDICAID

## 2020-08-11 DIAGNOSIS — M43.02 SPONDYLOLYSIS OF CERVICAL REGION: Primary | ICD-10-CM

## 2020-08-11 DIAGNOSIS — M62.838 SPASM OF MUSCLE: ICD-10-CM

## 2020-08-11 PROCEDURE — 97110 THERAPEUTIC EXERCISES: CPT | Mod: PO,CQ

## 2020-08-11 NOTE — PROGRESS NOTES
"  Physical Therapy Daily Treatment Note     Name: Andrez Hawkins  Clinic Number: [id number]    Therapy Diagnosis:   Encounter Diagnoses   Name Primary?    Spondylolysis of cervical region Yes    Spasm of muscle      Physician: Yulisa Serna FNP*    Visit Date: 8/11/2020  Physician Orders: PT Eval and Treat    Medical Diagnosis from Referral: neck pain   Evaluation Date: 8/3/2020  Authorization Period Expiration: 12/31/20   Plan of Care Expiration: 9/18/20   Visit # / Visits authorized: 3 / 8     Time In: 1045 AM   Time Out: 1130 AM   Total Billable Time: 45minutes     Precautions: Standard and Diabetes    Subjective     Pt reports: Pain stays at a constant 4-5/10 and with movements increases. Pt reports he can not see a neurologist until he does physical therapy.   He was compliant with home exercise program. Pt reports with the exercises, he is noticing pain on the right side of the neck too.  Response to previous treatment: no soreness  Functional change: too soon to tell    Pain: 4-5/10  Location: left sided neck pain     Objective     Pat received therapeutic exercises to develop strength, endurance, ROM, flexibility, posture and core stabilization for 45 minutes including:        Exercise  8/3/20 8/11/20   UBE - 2f/2b   Cervical ext with towel x20 x20   Cervical rot with towel   x20   tball rollouts  x20 x20   Thoacic ext  X 20  x20   Scap retract  X 20    Chin tucks  w/ ret X 20 x20   Upper trap  stretch  3x30"   Lev scap stretch  3x30"   Rows  #45 x 20   Shoulder extension  GTB x 20   Shoulder depression  Black band  x15                  Home Exercises Provided and Patient Education Provided     Education provided:   - HEP    Written Home Exercises Provided: yes.  Exercises were reviewed and Pat was able to demonstrate them prior to the end of the session.  Pat demonstrated good  understanding of the education provided.     See EMR under Media for exercises provided 8/11/2020.    Assessment "     Adjusted patient to a cervical retraction with the chin tuck and patient reported no sharp pain like he was previously having.  Pt requires cueing throughout treatment to prevent upper trap compensations and trunk sidebending.  Will progress within tolerance.  Pat is progressing well towards his goals.   Pt prognosis is Good.     Pt will continue to benefit from skilled outpatient physical therapy to address the deficits listed in the problem list box on initial evaluation, provide pt/family education and to maximize pt's level of independence in the home and community environment.     Pt's spiritual, cultural and educational needs considered and pt agreeable to plan of care and goals.    Anticipated barriers to physical therapy: insurance    Goals:     Short Term Goals: 3  weeks   - Tolerate PT exercises with minimal increase in pain for improved strength and conditioning   - Report 50% improvement in pain sx for improved tolerance with daily activities at home and in community.  - FOTO TBA      Long Term Goals: 6 weeks  - Restore full painfree ROM to cervical spine  for improved functional mobility   - Demonstrate improved strength of Ruel scap stab  to 5/5 for improved stability with all daily  activities   - Report MCID with FOTO  demonstrating return to PLOF with daily activities.   - Be engaged in HEP/fitness routine for continued strength and conditioning upon d/c from PT.           Plan     Plan of care Certification: 8/3/2020 to 9/18/20 .       Kayley Mclean, BRETT

## 2020-08-13 ENCOUNTER — CLINICAL SUPPORT (OUTPATIENT)
Dept: REHABILITATION | Facility: HOSPITAL | Age: 60
End: 2020-08-13
Payer: MEDICAID

## 2020-08-13 DIAGNOSIS — M43.02 SPONDYLOLYSIS OF CERVICAL REGION: Primary | ICD-10-CM

## 2020-08-13 DIAGNOSIS — M62.838 SPASM OF MUSCLE: ICD-10-CM

## 2020-08-13 PROCEDURE — 97140 MANUAL THERAPY 1/> REGIONS: CPT | Mod: PO,CQ

## 2020-08-13 PROCEDURE — 97110 THERAPEUTIC EXERCISES: CPT | Mod: PO,CQ

## 2020-08-13 NOTE — PROGRESS NOTES
"  Physical Therapy Daily Treatment Note     Name: Andrez Hawkins  Clinic Number: [id number]    Therapy Diagnosis:   Encounter Diagnoses   Name Primary?    Spondylolysis of cervical region Yes    Spasm of muscle      Physician: Yulisa Serna FNP*    Visit Date: 8/13/2020  Physician Orders: PT Eval and Treat    Medical Diagnosis from Referral: neck pain   Evaluation Date: 8/3/2020  Authorization Period Expiration: 12/31/20   Plan of Care Expiration: 9/18/20   Visit # / Visits authorized: 3 / 8     Time In: 1:00 PM   Time Out: 145 PM  Total Billable Time: 45minutes     Precautions: Standard and Diabetes    Subjective     Pt reports: Pain is about the same. Pt reports he notices the sharp pain mainly when he rotates to either side.  He is raising a 5 year old and he notices it the most with her when he is making quick movements.  He was compliant with home exercise program. No longer having sharp pain with chin tucks  Response to previous treatment: no soreness  Functional change: too soon to tell    Pain: 4/10  Location: left sided neck pain     Objective     Pat received therapeutic exercises to develop strength, endurance, ROM, flexibility, posture and core stabilization for 35 minutes including:        Exercise  8/3/20 8/11/20 8/13/20   UBE - 2f/2b 2f/2b   Cervical ext with towel x20 x20 x20   Cervical rot with towel   x20 x20   tball rollouts  x20 x20 -   Thoacic ext  X 20  x20 -   Scap retract  X 20  -   Chin tucks  w/ ret X 20 x20 x20   Upper trap  stretch  3x30" 3x30"   Lev scap stretch  3x30" 3x30"   Rows  #45 x 20 #45 x 30   Shoulder extension  GTB x 20 GTB x 30   Shoulder depression  Black band  x15 Black band  x20   Shoulder ext rot   GTB 3 x 10   Horizontal abd   RTB x 20   CC chops   #20 x 20         Pat received the following manual therapy techniques: Manual traction, Myofacial release and Soft tissue Mobilization were applied to the: neck for 10 minutes, including:  Gentle cervical " traction  Sub occipital release  Gentle cervical rotation stretching at end range        Home Exercises Provided and Patient Education Provided     Education provided:   - HEP    Written Home Exercises Provided: Patient instructed to cont prior HEP.  Exercises were reviewed and Pat was able to demonstrate them prior to the end of the session.  Pat demonstrated good  understanding of the education provided.     See EMR under Media for exercises provided 8/11/2020.    Assessment     Pt tolerated treatment very well.  Pt still limited by sharp pain in cervical rotation EROM.  Will progress within tolerance.  Pat is progressing well towards his goals.   Pt prognosis is Good.     Pt will continue to benefit from skilled outpatient physical therapy to address the deficits listed in the problem list box on initial evaluation, provide pt/family education and to maximize pt's level of independence in the home and community environment.     Pt's spiritual, cultural and educational needs considered and pt agreeable to plan of care and goals.    Anticipated barriers to physical therapy: insurance    Goals:     Short Term Goals: 3  weeks   - Tolerate PT exercises with minimal increase in pain for improved strength and conditioning   - Report 50% improvement in pain sx for improved tolerance with daily activities at home and in community.  - FOTO TBA      Long Term Goals: 6 weeks  - Restore full painfree ROM to cervical spine  for improved functional mobility   - Demonstrate improved strength of Ruel scap stab  to 5/5 for improved stability with all daily  activities   - Report MCID with FOTO  demonstrating return to PLOF with daily activities.   - Be engaged in HEP/fitness routine for continued strength and conditioning upon d/c from PT.           Plan     Plan of care Certification: 8/3/2020 to 9/18/20 .       Kayley Mclean, PTA

## 2020-08-18 ENCOUNTER — CLINICAL SUPPORT (OUTPATIENT)
Dept: REHABILITATION | Facility: HOSPITAL | Age: 60
End: 2020-08-18
Payer: MEDICAID

## 2020-08-18 DIAGNOSIS — M62.838 SPASM OF MUSCLE: ICD-10-CM

## 2020-08-18 DIAGNOSIS — M43.02 SPONDYLOLYSIS OF CERVICAL REGION: Primary | ICD-10-CM

## 2020-08-18 PROCEDURE — 97110 THERAPEUTIC EXERCISES: CPT | Mod: PO

## 2020-08-18 NOTE — PROGRESS NOTES
"  Physical Therapy Daily Treatment Note     Name: Andrez Hawkins  Clinic Number: [id number]    Therapy Diagnosis:   Encounter Diagnoses   Name Primary?    Spondylolysis of cervical region Yes    Spasm of muscle      Physician: Yulisa Serna FNP*    Visit Date: 8/18/2020  Physician Orders: PT Eval and Treat    Medical Diagnosis from Referral: neck pain   Evaluation Date: 8/3/2020  Authorization Period Expiration: 12/31/20   Plan of Care Expiration: 9/18/20   Visit # / Visits authorized: 4 / 8     Time In: 1:00 PM   Time Out: 145 PM  Total Billable Time: 45minutes     Precautions: Standard and Diabetes    Subjective     Pt reports: continues with R sided neck pain   He was compliant with home exercise program. No longer having sharp pain with chin tucks  Response to previous treatment: no soreness  Functional change: too soon to tell    Pain: 4/10  Location: left sided neck pain     Objective     Pat received therapeutic exercises to develop strength, endurance, ROM, flexibility, posture and core stabilization for 35 minutes including:        Exercise  8/3/20 8/11/20 8/13/20 8/18/20   UBE - 2f/2b 2f/2b L4 3f/3b   Cervical ext with towel x20 x20 x20 x20   Cervical rot with towel   x20 x20 -   tball rollouts  x20 x20 - x20   Thoacic ext  X 20  x20 - x20   Wall Slides - - - X 20    Scap retract  X 20  - -   Chin tucks  w/ ret X 20 x20 x20 -   Upper trap  stretch  3x30" 3x30" -   Lev scap stretch  3x30" 3x30" -   Rows  #45 x 20 #45 x 30    Shoulder extension  GTB x 20 GTB x 30 BTB x 15    Shoulder depression  Black band  x15 Black band  x20 -   Shoulder ext rot   GTB 3 x 10 -   Horizontal abd   RTB x 20 -   CC chops/lifts   #20 x 20 20#/13#   X 10 ea   CC Lat PD  - - - 17# x 20  23# x 20  30# x 12   Chest Press - - - 50# x 20  75# x 20  90# x 10   Upright Rows  - - - Red Cord   x 20   Front/lateral raises - - - 5# x 10 ea    Head nods with Swiss ball  - - - Org x 20           Home Exercises Provided and Patient " Education Provided     Education provided:   - HEP    Written Home Exercises Provided: Patient instructed to cont prior HEP.  Exercises were reviewed and Pat was able to demonstrate them prior to the end of the session.  Pat demonstrated good  understanding of the education provided.     See EMR under Media for exercises provided 8/11/2020.    Assessment     Good tolerance for exercise continued R sided neck pain.    Pat is progressing well towards his goals.   Pt prognosis is Good.     Pt will continue to benefit from skilled outpatient physical therapy to address the deficits listed in the problem list box on initial evaluation, provide pt/family education and to maximize pt's level of independence in the home and community environment.     Pt's spiritual, cultural and educational needs considered and pt agreeable to plan of care and goals.    Anticipated barriers to physical therapy: insurance    Goals:     Short Term Goals: 3  weeks   - Tolerate PT exercises with minimal increase in pain for improved strength and conditioning   - Report 50% improvement in pain sx for improved tolerance with daily activities at home and in community.  - FOTO TBA      Long Term Goals: 6 weeks  - Restore full painfree ROM to cervical spine  for improved functional mobility   - Demonstrate improved strength of Ruel scap stab  to 5/5 for improved stability with all daily  activities   - Report MCID with FOTO  demonstrating return to PLOF with daily activities.   - Be engaged in HEP/fitness routine for continued strength and conditioning upon d/c from PT.        Plan     Plan of care Certification: 8/3/2020 to 9/18/20 .       Stan Thomas, PT

## 2020-08-20 ENCOUNTER — CLINICAL SUPPORT (OUTPATIENT)
Dept: REHABILITATION | Facility: HOSPITAL | Age: 60
End: 2020-08-20
Payer: MEDICAID

## 2020-08-20 DIAGNOSIS — M62.838 SPASM OF MUSCLE: ICD-10-CM

## 2020-08-20 DIAGNOSIS — M43.02 SPONDYLOLYSIS OF CERVICAL REGION: Primary | ICD-10-CM

## 2020-08-20 PROCEDURE — 97110 THERAPEUTIC EXERCISES: CPT | Mod: PO

## 2020-08-20 NOTE — PROGRESS NOTES
Physical Therapy Daily Treatment Note     Name: Andrez Hawkins  Clinic Number: [id number]    Therapy Diagnosis:   No diagnosis found.  Physician: Yulisa Serna FNP*    Visit Date: 8/20/2020  Physician Orders: PT Eval and Treat    Medical Diagnosis from Referral: neck pain   Evaluation Date: 8/3/2020  Authorization Period Expiration: 12/31/20   Plan of Care Expiration: 9/18/20   Visit # / Visits authorized: 4 / 8     Time In: 1130 AM   Time Out: 1215  PM  Total Billable Time: 45 minutes     Precautions: Standard and Diabetes    Subjective     Pt reports: continues with L sided neck pain   He was compliant with home exercise program.   Response to previous treatment: no soreness  Functional change: continued pain with turning head to the L side     Pain: 4/10  Location: left sided neck pain     Objective     Pat received therapeutic exercises to develop strength, endurance, ROM, flexibility, posture and core stabilization for 40 minutes including:        Exercise  8/3/20 8/11/20 8/13/20 8/18/20 8/20/20   UBE - 2f/2b 2f/2b L4 3f/3b L43f/3b   Cervical ext with towel x20 x20 x20 x20 -   Cervical rot with towel   x20 x20 - -   tball rollouts  x20 x20 - x20 -   Thoacic ext  X 20  x20 - x20 -   Wall Slides - - - X 20  x20   Rows  #45 x 20 #45 x 30     CC chops/lifts   #20 x 20 20#/13#   X 10 ea 17#/13#   x 10 ea     CC Lat PD  - - - 17# x 20  23# x 20  30# x 12 23# x 15   27# x 15   Chest Press - - - 50# x 20  75# x 20  90# x 10 -   Upright Rows  - - - Red Cord   x 20 -   Front/lateral raises - - - 5# x 10 ea  -   Head nods with Swiss ball  - - - Org x 20 -      *Cervical Rotation SNAG to the Left with towel 2 x 10 with decreased L sided neck sx.     IDN x5 min 30 mm needles to L cervical paraspinals with no adverse effects.        Home Exercises Provided and Patient Education Provided     Education provided:   8/20/20:  Added cervical rotation SNAG L sided 2 x 10; 1 session per day  - HEP    Written Home  Exercises Provided: Patient instructed to cont prior HEP.  Exercises were reviewed and Pat was able to demonstrate them prior to the end of the session.  Pat demonstrated good  understanding of the education provided.     See EMR under Media for exercises provided 8/11/2020.    Assessment   + response to cervical rotation SNAG, able to get more ROM with decreased pain, given as HEP     Pat is progressing well towards his goals.   Pt prognosis is Good.     Pt will continue to benefit from skilled outpatient physical therapy to address the deficits listed in the problem list box on initial evaluation, provide pt/family education and to maximize pt's level of independence in the home and community environment.     Pt's spiritual, cultural and educational needs considered and pt agreeable to plan of care and goals.    Anticipated barriers to physical therapy: insurance    Goals:     Short Term Goals: 3  weeks   - Tolerate PT exercises with minimal increase in pain for improved strength and conditioning   - Report 50% improvement in pain sx for improved tolerance with daily activities at home and in community.  - FOTO TBA      Long Term Goals: 6 weeks  - Restore full painfree ROM to cervical spine  for improved functional mobility   - Demonstrate improved strength of Ruel scap stab  to 5/5 for improved stability with all daily  activities   - Report MCID with FOTO  demonstrating return to PLOF with daily activities.   - Be engaged in HEP/fitness routine for continued strength and conditioning upon d/c from PT.        Plan     Plan of care Certification: 8/3/2020 to 9/18/20 .       Stan Thomas, PT

## 2020-08-25 ENCOUNTER — CLINICAL SUPPORT (OUTPATIENT)
Dept: REHABILITATION | Facility: HOSPITAL | Age: 60
End: 2020-08-25
Payer: MEDICAID

## 2020-08-25 DIAGNOSIS — M43.02 SPONDYLOLYSIS OF CERVICAL REGION: Primary | ICD-10-CM

## 2020-08-25 DIAGNOSIS — M62.838 SPASM OF MUSCLE: ICD-10-CM

## 2020-08-25 PROCEDURE — 97110 THERAPEUTIC EXERCISES: CPT | Mod: PO

## 2020-08-25 NOTE — PROGRESS NOTES
Physical Therapy Daily Treatment Note     Name: Andrez Hawkins  Clinic Number: [id number]    Therapy Diagnosis:   Encounter Diagnoses   Name Primary?    Spondylolysis of cervical region Yes    Spasm of muscle      Physician: Yulisa Serna FNP*    Visit Date: 8/25/2020  Physician Orders: PT Eval and Treat    Medical Diagnosis from Referral: neck pain   Evaluation Date: 8/3/2020  Authorization Period Expiration: 12/31/20   Plan of Care Expiration: 9/18/20   Visit # / Visits authorized: 4 / 8     Time In: 1130 AM   Time Out: 1145  PM  Total Billable Time: 45 minutes     Precautions: Standard and Diabetes    Subjective     Pt reports: continues with L sided neck pain   He was compliant with home exercise program.   Response to previous treatment: no soreness  Functional change: continued pain with turning head to the L side     Pain: 4/10  Location: left sided neck pain     Objective     Pat received therapeutic exercises to develop strength, endurance, ROM, flexibility, posture and core stabilization for 10 minutes including:        Exercise  8/3/20 8/11/20 8/13/20 8/18/20 8/20/20 8/25/20   UBE - 2f/2b 2f/2b L4 3f/3b L43f/3b 4f/4b   Cervical ext with towel x20 x20 x20 x20 -    Cervical rot with towel   x20 x20 - -    tball rollouts  x20 x20 - x20 -    Thoacic ext  X 20  x20 - x20 -    Wall Slides - - - X 20  x20    Rows  #45 x 20 #45 x 30      CC chops/lifts   #20 x 20 20#/13#   X 10 ea 17#/13#   x 10 ea      CC Lat PD  - - - 17# x 20  23# x 20  30# x 12 23# x 15   27# x 15    Chest Press - - - 50# x 20  75# x 20  90# x 10 -    Upright Rows  - - - Red Cord   x 20 -    Front/lateral raises - - - 5# x 10 ea  -    Head nods with Swiss ball  - - - Org x 20 -       *Cervical Rotation SNAG to the Left with towel 2 x 10 with decreased L sided neck sx.     IDN x5 min 30 mm needles to L cervical paraspinals with no adverse effects.        Home Exercises Provided and Patient Education Provided     Education provided:    8/20/20:  Added cervical rotation SNAG L sided 2 x 10; 1 session per day  - HEP    Written Home Exercises Provided: Patient instructed to cont prior HEP.  Exercises were reviewed and Pat was able to demonstrate them prior to the end of the session.  Pat demonstrated good  understanding of the education provided.     See EMR under Media for exercises provided 8/11/2020.    Assessment   Reviewed cervical SNAG for increased L rotation, decreased pain and improved ROM with this stretch     Pat is progressing well towards his goals.   Pt prognosis is Good.     Pt will continue to benefit from skilled outpatient physical therapy to address the deficits listed in the problem list box on initial evaluation, provide pt/family education and to maximize pt's level of independence in the home and community environment.     Pt's spiritual, cultural and educational needs considered and pt agreeable to plan of care and goals.    Anticipated barriers to physical therapy: insurance    Goals:     Short Term Goals: 3  weeks   - Tolerate PT exercises with minimal increase in pain for improved strength and conditioning   - Report 50% improvement in pain sx for improved tolerance with daily activities at home and in community.  - FOTO TBA      Long Term Goals: 6 weeks  - Restore full painfree ROM to cervical spine  for improved functional mobility   - Demonstrate improved strength of Ruel scap stab  to 5/5 for improved stability with all daily  activities   - Report MCID with FOTO  demonstrating return to PLOF with daily activities.   - Be engaged in HEP/fitness routine for continued strength and conditioning upon d/c from PT.        Plan     Plan of care Certification: 8/3/2020 to 9/18/20 .       Stan Thomas, PT

## 2020-08-27 ENCOUNTER — CLINICAL SUPPORT (OUTPATIENT)
Dept: REHABILITATION | Facility: HOSPITAL | Age: 60
End: 2020-08-27
Payer: MEDICAID

## 2020-08-27 DIAGNOSIS — M62.838 SPASM OF MUSCLE: Primary | ICD-10-CM

## 2020-08-27 DIAGNOSIS — M43.02 SPONDYLOLYSIS OF CERVICAL REGION: ICD-10-CM

## 2020-08-27 PROCEDURE — 97110 THERAPEUTIC EXERCISES: CPT | Mod: PO

## 2020-08-27 NOTE — PROGRESS NOTES
Physical Therapy Daily Treatment Note     Name: Andrez Hawkins  Clinic Number: [id number]    Therapy Diagnosis:   Encounter Diagnoses   Name Primary?    Spasm of muscle Yes    Spondylolysis of cervical region      Physician: Yulisa Serna FNP*    Visit Date: 8/27/2020  Physician Orders: PT Eval and Treat    Medical Diagnosis from Referral: neck pain   Evaluation Date: 8/3/2020  Authorization Period Expiration: 12/31/20   Plan of Care Expiration: 9/18/20   Visit # / Visits authorized: 4 / 8     Time In: 1130 AM   Time Out: 1145  PM  Total Billable Time: 45 minutes     Precautions: Standard and Diabetes    Subjective     Pt reports: continues with L sided neck pain   He was compliant with home exercise program.   Response to previous treatment: no soreness  Functional change: continued pain with turning head to the L side     Pain: 4/10  Location: left sided neck pain     Objective     Pat received therapeutic exercises to develop strength, endurance, ROM, flexibility, posture and core stabilization for 10 minutes including:        Exercise  8/3/20 8/11/20 8/13/20 8/18/20 8/20/20 8/25/20 8/27/20   UBE - 2f/2b 2f/2b L4 3f/3b L43f/3b 4f/4b 4f/4b   Cervical ext with towel x20 x20 x20 x20 - - x20   Cervical rot with towel   x20 x20 - - 2x10 x20   tball rollouts  x20 x20 - x20 - -    Thoacic ext  X 20  x20 - x20 - -    Wall Slides - - - X 20  x20 -    Rows  #45 x 20 #45 x 30   -    CC chops/lifts   #20 x 20 20#/13#   X 10 ea 17#/13#   x 10 ea   - 17# x 20    CC Lat PD  - - - 17# x 20  23# x 20  30# x 12 23# x 15   27# x 15 - 17# x 20   Chest Press - - - 50# x 20  75# x 20  90# x 10 80# x 15   - 80# x 20    Upright Rows  - - - Red Cord   x 20 - - x20   Front/lateral raises - - - 5# x 10 ea  - - 4# x 20   Head nods with Swiss ball  - - - Org x 20 - - x20       *Cervical Rotation SNAG to the Left with towel 2 x 10 with decreased L sided neck sx.            Home Exercises Provided and Patient Education Provided      Education provided:   8/20/20:  Added cervical rotation SNAG L sided 2 x 10; 1 session per day  - HEP    Written Home Exercises Provided: Patient instructed to cont prior HEP.  Exercises were reviewed and Pat was able to demonstrate them prior to the end of the session.  Pat demonstrated good  understanding of the education provided.     See EMR under Media for exercises provided 8/11/2020.    Assessment   Progressing activity tolerance, improving cervical ROM     Pat is progressing well towards his goals.   Pt prognosis is Good.     Pt will continue to benefit from skilled outpatient physical therapy to address the deficits listed in the problem list box on initial evaluation, provide pt/family education and to maximize pt's level of independence in the home and community environment.     Pt's spiritual, cultural and educational needs considered and pt agreeable to plan of care and goals.    Anticipated barriers to physical therapy: insurance    Goals:     Short Term Goals: 3  weeks   - Tolerate PT exercises with minimal increase in pain for improved strength and conditioning   - Report 50% improvement in pain sx for improved tolerance with daily activities at home and in community.  - FOTO TBA      Long Term Goals: 6 weeks  - Restore full painfree ROM to cervical spine  for improved functional mobility   - Demonstrate improved strength of Ruel scap stab  to 5/5 for improved stability with all daily  activities   - Report MCID with FOTO  demonstrating return to PLOF with daily activities.   - Be engaged in HEP/fitness routine for continued strength and conditioning upon d/c from PT.        Plan     Plan of care Certification: 8/3/2020 to 9/18/20 .       Stan Thomas, PT

## 2020-09-01 ENCOUNTER — CLINICAL SUPPORT (OUTPATIENT)
Dept: REHABILITATION | Facility: HOSPITAL | Age: 60
End: 2020-09-01
Payer: MEDICAID

## 2020-09-01 DIAGNOSIS — M43.02 SPONDYLOLYSIS OF CERVICAL REGION: ICD-10-CM

## 2020-09-01 DIAGNOSIS — M62.838 SPASM OF MUSCLE: Primary | ICD-10-CM

## 2020-09-01 PROCEDURE — 97110 THERAPEUTIC EXERCISES: CPT | Mod: PO

## 2020-09-01 NOTE — PROGRESS NOTES
Physical Therapy Daily Treatment Note     Name: Andrez Hawkins  Clinic Number: [id number]    Therapy Diagnosis:   Encounter Diagnoses   Name Primary?    Spasm of muscle Yes    Spondylolysis of cervical region      Physician: Yulisa Serna FNP*    Visit Date: 9/1/2020  Physician Orders: PT Eval and Treat    Medical Diagnosis from Referral: neck pain   Evaluation Date: 8/3/2020  Authorization Period Expiration: 12/31/20   Plan of Care Expiration: 9/18/20   Visit # / Visits authorized: 4 / 8     Time In: 1130 AM   Time Out: 1205  PM  Total Billable Time: 35 minutes     Precautions: Standard and Diabetes    Subjective     Pt reports: continues with L sided neck pain   He was compliant with home exercise program.   Response to previous treatment: no soreness  Functional change: continued pain with turning head to the L side     Pain: 4/10  Location: left sided neck pain     Objective     Pat received therapeutic exercises to develop strength, endurance, ROM, flexibility, posture and core stabilization for 10 minutes including:        Exercise  8/3/20 8/11/20 8/13/20 8/18/20 8/20/20 8/25/20 8/27/20 8/31/20   UBE - 2f/2b 2f/2b L4 3f/3b L43f/3b 4f/4b 4f/4b 4/4   Cervical ext with towel x20 x20 x20 x20 - - x20 x20   tball rollouts  x20 x20 - x20 - - - -   Thoacic ext  X 20  x20 - x20 - -     Wall Slides - - - X 20  x20 -     Rows  #45 x 20 #45 x 30   -     CC chops/lifts   #20 x 20 20#/13#   X 10 ea 17#/13#   x 10 ea   - 17# x 20  17# x 20   CC Lat PD  - - - 17# x 20  23# x 20  30# x 12 23# x 15   27# x 15 - 17# x 20 20# x 20   Chest Press - - - 50# x 20  75# x 20  90# x 10 80# x 15   - 80# x 20  80# x 20    Upright Rows  - - - Red Cord   x 20 - - x20 x20   Front/lateral raises - - - 5# x 10 ea  - - 4# x 20 4# x 20   Head nods with Swiss ball  - - - Org x 20 - - x20  x20      *Cervical Rotation SNAG to the Left with towel 2 x 10 with decreased L sided neck sx.   -Rock back, Cat/camel x 20      IDN to L cervical  paraspinals 40mm needles with no adverse effects x 5 min        Home Exercises Provided and Patient Education Provided     Education provided:   8/20/20:  Added cervical rotation SNAG L sided 2 x 10; 1 session per day  - HEP    Written Home Exercises Provided: Patient instructed to cont prior HEP.  Exercises were reviewed and Pat was able to demonstrate them prior to the end of the session.  Pat demonstrated good  understanding of the education provided.     See EMR under Media for exercises provided 8/11/2020.    Assessment   Progressing activity tolerance, improving cervical ROM     Pat is progressing well towards his goals.   Pt prognosis is Good.     Pt will continue to benefit from skilled outpatient physical therapy to address the deficits listed in the problem list box on initial evaluation, provide pt/family education and to maximize pt's level of independence in the home and community environment.     Pt's spiritual, cultural and educational needs considered and pt agreeable to plan of care and goals.    Anticipated barriers to physical therapy: insurance    Goals:     Short Term Goals: 3  weeks   - Tolerate PT exercises with minimal increase in pain for improved strength and conditioning   - Report 50% improvement in pain sx for improved tolerance with daily activities at home and in community.  - FOTO TBA      Long Term Goals: 6 weeks  - Restore full painfree ROM to cervical spine  for improved functional mobility   - Demonstrate improved strength of Ruel scap stab  to 5/5 for improved stability with all daily  activities   - Report MCID with FOTO  demonstrating return to PLOF with daily activities.   - Be engaged in HEP/fitness routine for continued strength and conditioning upon d/c from PT.        Plan     Plan of care Certification: 8/3/2020 to 9/18/20 .       Stan Thomas, PT

## 2020-11-24 ENCOUNTER — OFFICE VISIT (OUTPATIENT)
Dept: NEUROSURGERY | Facility: CLINIC | Age: 60
End: 2020-11-24
Payer: MEDICAID

## 2020-11-24 VITALS
DIASTOLIC BLOOD PRESSURE: 79 MMHG | HEIGHT: 68 IN | HEART RATE: 84 BPM | WEIGHT: 220 LBS | SYSTOLIC BLOOD PRESSURE: 139 MMHG | BODY MASS INDEX: 33.34 KG/M2

## 2020-11-24 DIAGNOSIS — J44.9 CHRONIC OBSTRUCTIVE PULMONARY DISEASE, UNSPECIFIED COPD TYPE: ICD-10-CM

## 2020-11-24 DIAGNOSIS — M48.02 SPINAL STENOSIS, CERVICAL REGION: Primary | ICD-10-CM

## 2020-11-24 PROCEDURE — 99204 OFFICE O/P NEW MOD 45 MIN: CPT | Mod: S$PBB,,, | Performed by: NURSE PRACTITIONER

## 2020-11-24 PROCEDURE — 99204 PR OFFICE/OUTPT VISIT, NEW, LEVL IV, 45-59 MIN: ICD-10-PCS | Mod: S$PBB,,, | Performed by: NURSE PRACTITIONER

## 2020-11-24 PROCEDURE — 99213 OFFICE O/P EST LOW 20 MIN: CPT | Mod: PBBFAC,PN | Performed by: NURSE PRACTITIONER

## 2020-11-24 PROCEDURE — 99999 PR PBB SHADOW E&M-EST. PATIENT-LVL III: ICD-10-PCS | Mod: PBBFAC,,, | Performed by: NURSE PRACTITIONER

## 2020-11-24 PROCEDURE — 99999 PR PBB SHADOW E&M-EST. PATIENT-LVL III: CPT | Mod: PBBFAC,,, | Performed by: NURSE PRACTITIONER

## 2020-11-24 RX ORDER — SIMVASTATIN 40 MG/1
40 TABLET, FILM COATED ORAL DAILY
COMMUNITY

## 2020-11-24 RX ORDER — DULOXETIN HYDROCHLORIDE 30 MG/1
30 CAPSULE, DELAYED RELEASE ORAL DAILY
COMMUNITY

## 2020-11-24 NOTE — LETTER
November 24, 2020      Yulisa Serna, FNP-C  [address] [facility] [address]           [facility]  [address] [place] [address]  Phone: [phone]  Fax: [phone]          Patient: Andrez Hawkins   MR Number: [id number]   YOB: 1960   Date of Visit: 11/24/2020       Dear Yulisa Serna:    Thank you for referring Andrez Hawkins to me for evaluation. Attached you will find relevant portions of my assessment and plan of care.    If you have questions, please do not hesitate to call me. I look forward to following Andrez Hawkins along with you.    Sincerely,    Pascale Bacon, NP    Enclosure  CC:  No Recipients    If you would like to receive this communication electronically, please contact [email or web address] or [phone] to request more information on [facility] access.    For providers and/or their staff who would like to refer a patient to [facility], please contact us through our one-stop-shop provider referral line, [facility] , at [phone].    If you feel you have received this communication in error or would no longer like to receive these types of communications, please e-mail [email or web address]

## 2020-11-24 NOTE — PROGRESS NOTES
"  Neurosurgery History & Physical    Patient ID: Andrez Hawkins is a 60 y.o. male.    Chief Complaint   Patient presents with    Neck Pain     Patient started having shooting pain in neck about 6 months ago. He said it started "out of the blue". He completed physical therapy with no relief. He states his face often feels strange when he wakes up at night. Like it is not attached to the rest of his body. He also has nausea very often.        History of Present Illness:     Mr. Hawkins is a 60-year-old  male with DM 2, COPD, GERD, HLD, HTN, history of prostate cancer status post prostatectomy 2015 who presents today for evaluation of neck pain.  He reports that his neck pain began about 6 months ago without obvious trauma/injury.  The pain was present upon waking in his posterior neck, left greater than right side.  The pain is exacerbated with quick movements or range of motion.  The pain radiates to his left shoulder so with quick movements are range of motion.  He denies numbness/tingling, weakness, gait instability, over bladder bowel dysfunction.  Denies issues with hand dexterity or hand numbness.  He is ambulatory without assistive device.  He was evaluated in the emergency department the day after the pain began and a CT scan was performed.  He was discharged with muscle relaxers and tramadol told to follow up with PCP as an outpatient.  His PCP as previously ordered MRI and cervical spine but was denied by insurance.  He has undergone 6 weeks of formal physical therapy with acupuncture and reports no relief, even some worsening of pain following exercises.  He has not been evaluated by pain management.    Overall, he reports the pain has gradually improved but attributes this to getting used to the pain.    During our visit he is actively short of breath and audible wheezing.  He reports he is on COPD medications and this is baseline.    Review of Systems   Constitutional: Negative for " activity change, appetite change and fatigue.   Eyes: Negative for photophobia and visual disturbance.   Respiratory: Positive for shortness of breath and wheezing. Negative for cough.    Cardiovascular: Negative for leg swelling.   Gastrointestinal: Negative for nausea.   Musculoskeletal: Positive for neck pain. Negative for arthralgias, back pain and gait problem.   Skin: Negative for wound.   Neurological: Negative for dizziness, weakness, numbness and headaches.       Past Medical History:   Diagnosis Date    Diabetes mellitus     GERD (gastroesophageal reflux disease)     Hepatitis C infection     History of hepatitis C, s/p successful Rx w/ SVR12 (cure) - 2018 10/14/2016    S/p Zepatier w/ SVR    Hyperlipidemia     Hypertension     Prostate cancer      Social History     Socioeconomic History    Marital status: Single     Spouse name: Not on file    Number of children: Not on file    Years of education: Not on file    Highest education level: Not on file   Occupational History    Not on file   Social Needs    Financial resource strain: Not on file    Food insecurity     Worry: Not on file     Inability: Not on file    Transportation needs     Medical: Not on file     Non-medical: Not on file   Tobacco Use    Smoking status: Former Smoker     Quit date: 2009     Years since quittin.3   Substance and Sexual Activity    Alcohol use: Yes     Alcohol/week: 0.0 standard drinks     Comment: seldom    Drug use: No    Sexual activity: Not on file   Lifestyle    Physical activity     Days per week: Not on file     Minutes per session: Not on file    Stress: Not on file   Relationships    Social connections     Talks on phone: Not on file     Gets together: Not on file     Attends Bahai service: Not on file     Active member of club or organization: Not on file     Attends meetings of clubs or organizations: Not on file     Relationship status: Not on file   Other Topics Concern     "Not on file   Social History Narrative    Not on file     Family History   Problem Relation Age of Onset    Heart disease Father      Review of patient's allergies indicates:  No Known Allergies    Current Outpatient Medications:     DULoxetine (CYMBALTA) 30 MG capsule, Take 30 mg by mouth once daily., Disp: , Rfl:     metformin (GLUCOPHAGE-XR) 500 MG 24 hr tablet, Take 2 tablets (1,000 mg total) by mouth daily with breakfast., Disp: 60 tablet, Rfl: 2    metronidazole (METROGEL) 0.75 % gel, , Disp: , Rfl:     ranitidine (ZANTAC) 150 MG tablet, Take 150 mg by mouth 2 (two) times daily., Disp: , Rfl:     simvastatin (ZOCOR) 40 MG tablet, Take 40 mg by mouth every evening., Disp: , Rfl:     TRUE METRIX GLUCOSE TEST STRIP Strp, , Disp: , Rfl:     TRUEPLUS LANCETS 28 gauge Misc, , Disp: , Rfl:     lisinopril 10 MG tablet, Take 1 tablet (10 mg total) by mouth once daily., Disp: 30 tablet, Rfl: 3    meclizine (ANTIVERT) 25 mg tablet, , Disp: , Rfl:     naproxen (NAPROSYN) 500 MG tablet, Take 1 tablet (500 mg total) by mouth 2 (two) times daily with meals. (Patient not taking: Reported on 11/24/2020), Disp: 14 tablet, Rfl: 0  Blood pressure 139/79, pulse 84, height 5' 8" (1.727 m), weight 99.8 kg (220 lb).      Neurologic Exam     Mental Status   Oriented to person, place, and time.   Level of consciousness: alert    Cranial Nerves   Cranial nerves II through XII intact.     Motor Exam   Muscle bulk: normal  Overall muscle tone: normal  Right arm tone: normal  Left arm tone: normal  Right arm pronator drift: absent  Left arm pronator drift: absent  Right leg tone: normal  Left leg tone: normal    Strength   Strength 5/5 throughout.     Sensory Exam   Light touch normal.     Gait, Coordination, and Reflexes     Gait  Gait: normal    Coordination   Finger to nose coordination: normal    Reflexes   Right brachioradialis: 2+  Left brachioradialis: 2+  Right biceps: 2+  Left biceps: 2+  Right patellar: 2+  Left " patellar: 2+  Right achilles: 2+  Left achilles: 2+  Right Morales: absent  Left Morales: absent  Right ankle clonus: absent  Left ankle clonus: absent      Physical Exam  Constitutional:       Appearance: He is well-developed.   HENT:      Head: Normocephalic and atraumatic.   Eyes:      Conjunctiva/sclera: Conjunctivae normal.   Neck:      Musculoskeletal: Normal range of motion.   Abdominal:      Tenderness: There is no guarding.   Musculoskeletal: Normal range of motion.   Skin:     General: Skin is warm and dry.   Neurological:      Mental Status: He is alert and oriented to person, place, and time.      Coordination: Finger-Nose-Finger Test normal.      Gait: Gait is intact.      Deep Tendon Reflexes: Strength normal.      Reflex Scores:       Bicep reflexes are 2+ on the right side and 2+ on the left side.       Brachioradialis reflexes are 2+ on the right side and 2+ on the left side.       Patellar reflexes are 2+ on the right side and 2+ on the left side.       Achilles reflexes are 2+ on the right side and 2+ on the left side.        Oswestry: 14%  PHQ: 2    Imaging:   CT cervical spine dated 4/2020 reviewed. Imaging demonstrates straightening of the normal cervical lordosis.  There is multilevel osseous cervical spondylosis with disc space narrowing, worse at C5-6.  At C5-6, there does appear to be some bony stenosis and to a lesser degree is also evident at C6-7.    Assessment & Plan:   Mr. Hawkins is a 60-year-old male with multilevel cervical spondylosis.  I have discussed imaging findings and exam the patient.  He is intact on neurologic exam with no signs of myelopathy but given his persistent symptoms, I would like to obtain further imaging.  I will order MRI and dynamic x-rays of the cervical spine to better visualize soft tissue structures and evaluate for compressive pathology from bony stenosis seen on CT.  I will follow-up with him via phone following his imaging.  He is agreeable to the  plan.  He will call our office with any questions/concerns or if his symptoms worsen or new symptoms develop.

## 2020-12-09 ENCOUNTER — TELEPHONE (OUTPATIENT)
Dept: NEUROSURGERY | Facility: CLINIC | Age: 60
End: 2020-12-09

## 2020-12-09 DIAGNOSIS — M47.812 CERVICAL SPONDYLOSIS: Primary | ICD-10-CM

## 2020-12-09 DIAGNOSIS — M43.12 SPONDYLOLISTHESIS OF CERVICAL REGION: ICD-10-CM

## 2020-12-10 NOTE — TELEPHONE ENCOUNTER
Reviewed MRI cervical spine. There is abnormal straightening of the cervical spine and multilevel degenerative changes. There is retrolisthesis at C5-6 and disc osteophyte complex, eccentric to the right. There is effacement of both ventral and dorsal CSF. Moderate central and foraminal stenosis.  At C4-5, there is right disc osteophyte complex with effacement of ventral CSF. Mild to moderate central and foraminal stenosis.   No dynamic instability on XR and not significantly changed since 2013 XR.    Discussed with Dr. Hawk, Rec'd Pain Mgmt referral. Patient is agreeable. Will enter referral.

## 2020-12-23 ENCOUNTER — TELEPHONE (OUTPATIENT)
Dept: NEUROSURGERY | Facility: CLINIC | Age: 60
End: 2020-12-23

## 2020-12-23 NOTE — TELEPHONE ENCOUNTER
Hi! A referral has been placed for patient to see pain management. Please call to schedule. Thanks!

## 2021-07-21 PROBLEM — R10.13 EPIGASTRIC PAIN: Status: RESOLVED | Noted: 2019-06-14 | Resolved: 2021-07-21

## 2021-07-21 PROBLEM — K43.0 INCISIONAL HERNIA WITH OBSTRUCTION BUT NO GANGRENE: Status: ACTIVE | Noted: 2021-07-21

## 2021-07-21 PROBLEM — M62.838 SPASM OF MUSCLE: Status: RESOLVED | Noted: 2020-08-03 | Resolved: 2021-07-21

## 2023-05-18 ENCOUNTER — TELEPHONE (OUTPATIENT)
Dept: FAMILY MEDICINE | Facility: CLINIC | Age: 63
End: 2023-05-18

## 2023-05-18 ENCOUNTER — TELEPHONE (OUTPATIENT)
Dept: FAMILY MEDICINE | Facility: CLINIC | Age: 63
End: 2023-05-18
Payer: MEDICARE

## 2023-05-18 NOTE — TELEPHONE ENCOUNTER
----- Message from Jasmin Davis sent at 5/17/2023  4:42 PM CDT -----  Type:  Sooner Appointment Request    Caller is requesting a sooner appointment.  Caller declined first available appointment listed below.  Caller will not accept being placed on the waitlist and is requesting a message be sent to doctor.    Name of Caller:  Pt  When is the first available appointment?  N/A  Symptoms:  Annual visit  Best Call Back Number:  [phone]  Additional Information:  Pt is looking to establish care with the karla Babin call bk to advise Thanks

## 2023-05-18 NOTE — TELEPHONE ENCOUNTER
Pt states that he just changed to [facility] and when he got his new card , his PCP on the card was Dr Banks . Pt calling to get appt sched . Pls advise if you are taking new patients .  --lp

## 2023-05-18 NOTE — TELEPHONE ENCOUNTER
----- Message from Jasmin Davis sent at 5/17/2023  4:42 PM CDT -----  Type:  Sooner Appointment Request    Caller is requesting a sooner appointment.  Caller declined first available appointment listed below.  Caller will not accept being placed on the waitlist and is requesting a message be sent to doctor.    Name of Caller:  Pt  When is the first available appointment?  N/A  Symptoms:  Annual visit  Best Call Back Number:  [phone]  Additional Information:  Pt is looking to establish care with the karla Babin call bk to advise Thanks

## 2025-04-08 PROBLEM — F32.A ANXIETY AND DEPRESSION: Status: ACTIVE | Noted: 2025-04-08

## 2025-04-08 PROBLEM — Z71.89 ACP (ADVANCE CARE PLANNING): Status: ACTIVE | Noted: 2017-02-22

## 2025-04-08 PROBLEM — E87.6 HYPOKALEMIA: Status: ACTIVE | Noted: 2025-04-08

## 2025-04-08 PROBLEM — E66.09 OBESITY DUE TO EXCESS CALORIES: Status: ACTIVE | Noted: 2025-04-08

## 2025-04-08 PROBLEM — Z79.4 TYPE 2 DIABETES MELLITUS, WITH LONG-TERM CURRENT USE OF INSULIN: Status: ACTIVE | Noted: 2025-04-08

## 2025-04-08 PROBLEM — K21.9 GASTROESOPHAGEAL REFLUX DISEASE WITHOUT ESOPHAGITIS: Status: ACTIVE | Noted: 2025-04-08

## 2025-04-08 PROBLEM — E11.9 TYPE 2 DIABETES MELLITUS, WITH LONG-TERM CURRENT USE OF INSULIN: Status: ACTIVE | Noted: 2025-04-08

## 2025-04-08 PROBLEM — F41.9 ANXIETY AND DEPRESSION: Status: ACTIVE | Noted: 2025-04-08

## 2025-04-08 PROBLEM — E78.2 MIXED HYPERLIPIDEMIA: Status: ACTIVE | Noted: 2025-04-08

## 2025-04-10 PROBLEM — E87.6 HYPOKALEMIA: Status: RESOLVED | Noted: 2025-04-08 | Resolved: 2025-04-10
